# Patient Record
Sex: MALE | Race: WHITE | Employment: OTHER | URBAN - METROPOLITAN AREA
[De-identification: names, ages, dates, MRNs, and addresses within clinical notes are randomized per-mention and may not be internally consistent; named-entity substitution may affect disease eponyms.]

---

## 2018-07-30 LAB
LV EF: 65 %
LVEF MODALITY: NORMAL

## 2022-11-23 LAB — CPK: 73 U/L (ref 39–308)

## 2022-11-28 ENCOUNTER — HOSPITAL ENCOUNTER (OUTPATIENT)
Age: 67
Discharge: HOME | End: 2022-11-28
Payer: COMMERCIAL

## 2022-11-28 VITALS
OXYGEN SATURATION: 93 % | DIASTOLIC BLOOD PRESSURE: 86 MMHG | WEIGHT: 288 LBS | RESPIRATION RATE: 18 BRPM | BODY MASS INDEX: 41.23 KG/M2 | TEMPERATURE: 98 F | HEART RATE: 69 BPM | SYSTOLIC BLOOD PRESSURE: 136 MMHG | HEIGHT: 70 IN

## 2022-11-28 LAB
ABO/RH: NORMAL
ANTIBODY SCREEN: NORMAL

## 2022-11-28 PROCEDURE — C1769 GUIDE WIRE: HCPCS

## 2022-11-28 PROCEDURE — 93454 CORONARY ARTERY ANGIO S&I: CPT

## 2022-11-28 PROCEDURE — 86901 BLOOD TYPING SEROLOGIC RH(D): CPT

## 2022-11-28 PROCEDURE — 86850 RBC ANTIBODY SCREEN: CPT

## 2022-11-28 PROCEDURE — C1887 CATHETER, GUIDING: HCPCS

## 2022-11-28 PROCEDURE — C1894 INTRO/SHEATH, NON-LASER: HCPCS

## 2022-11-28 PROCEDURE — 36415 COLL VENOUS BLD VENIPUNCTURE: CPT

## 2022-11-28 PROCEDURE — 2500000003 HC RX 250 WO HCPCS

## 2022-11-28 PROCEDURE — 6360000002 HC RX W HCPCS

## 2022-11-28 PROCEDURE — 86900 BLOOD TYPING SEROLOGIC ABO: CPT

## 2022-11-28 PROCEDURE — 2709999900 HC NON-CHARGEABLE SUPPLY

## 2022-11-28 RX ORDER — ATORVASTATIN CALCIUM 40 MG/1
40 TABLET, FILM COATED ORAL DAILY
Qty: 90 TABLET | Refills: 2 | Status: SHIPPED | OUTPATIENT
Start: 2022-11-28

## 2022-11-28 RX ORDER — ALBUTEROL SULFATE 90 UG/1
2 AEROSOL, METERED RESPIRATORY (INHALATION) EVERY 4 HOURS PRN
COMMUNITY

## 2022-11-28 RX ORDER — SODIUM CHLORIDE 0.9 % (FLUSH) 0.9 %
5-40 SYRINGE (ML) INJECTION EVERY 12 HOURS SCHEDULED
Status: DISCONTINUED | OUTPATIENT
Start: 2022-11-28 | End: 2022-11-29 | Stop reason: HOSPADM

## 2022-11-28 RX ORDER — ACETAMINOPHEN 325 MG/1
650 TABLET ORAL EVERY 4 HOURS PRN
Status: DISCONTINUED | OUTPATIENT
Start: 2022-11-28 | End: 2022-11-29 | Stop reason: HOSPADM

## 2022-11-28 RX ORDER — SODIUM CHLORIDE 0.9 % (FLUSH) 0.9 %
5-40 SYRINGE (ML) INJECTION PRN
Status: DISCONTINUED | OUTPATIENT
Start: 2022-11-28 | End: 2022-11-29 | Stop reason: HOSPADM

## 2022-11-28 RX ORDER — FORMOTEROL FUMARATE 20 UG/2ML
20 SOLUTION RESPIRATORY (INHALATION) EVERY 12 HOURS SCHEDULED
COMMUNITY

## 2022-11-28 RX ORDER — POTASSIUM CHLORIDE 20 MEQ/1
20 TABLET, EXTENDED RELEASE ORAL 2 TIMES DAILY
COMMUNITY

## 2022-11-28 RX ORDER — BUMETANIDE 2 MG/1
2 TABLET ORAL DAILY
Qty: 90 TABLET | Refills: 2 | Status: SHIPPED | OUTPATIENT
Start: 2022-11-28

## 2022-11-28 RX ORDER — SODIUM CHLORIDE 9 MG/ML
INJECTION, SOLUTION INTRAVENOUS PRN
Status: DISCONTINUED | OUTPATIENT
Start: 2022-11-28 | End: 2022-11-29 | Stop reason: HOSPADM

## 2022-11-28 RX ORDER — IRON POLYSACCH/IRON HEME POLYP 28 MG
TABLET ORAL
COMMUNITY

## 2022-11-28 RX ORDER — DOCUSATE SODIUM 100 MG/1
100 CAPSULE, LIQUID FILLED ORAL DAILY
COMMUNITY

## 2022-11-28 RX ORDER — FLUOXETINE 10 MG/1
10 CAPSULE ORAL DAILY
COMMUNITY

## 2022-11-28 RX ORDER — ATORVASTATIN CALCIUM 40 MG/1
40 TABLET, FILM COATED ORAL DAILY
COMMUNITY
End: 2022-11-28 | Stop reason: SDUPTHER

## 2022-11-28 RX ORDER — METOPROLOL SUCCINATE 25 MG/1
25 TABLET, EXTENDED RELEASE ORAL DAILY
COMMUNITY

## 2022-11-28 RX ORDER — MULTIVITAMIN/IRON/FOLIC ACID 18MG-0.4MG
TABLET ORAL
COMMUNITY

## 2022-11-28 RX ORDER — LEVETIRACETAM 500 MG/1
500 TABLET ORAL 3 TIMES DAILY
COMMUNITY

## 2022-11-28 RX ORDER — MONTELUKAST SODIUM 10 MG/1
10 TABLET ORAL NIGHTLY
COMMUNITY

## 2022-11-28 RX ORDER — LAMOTRIGINE 25 MG/1
75 TABLET ORAL 2 TIMES DAILY
COMMUNITY

## 2022-11-28 RX ORDER — GABAPENTIN 300 MG/1
300 CAPSULE ORAL 2 TIMES DAILY
COMMUNITY

## 2022-11-28 RX ORDER — CLOPIDOGREL BISULFATE 75 MG/1
75 TABLET ORAL DAILY
Qty: 90 TABLET | Refills: 3 | Status: SHIPPED | OUTPATIENT
Start: 2022-11-28

## 2022-11-28 RX ORDER — ASPIRIN 81 MG/1
81 TABLET ORAL DAILY
Qty: 180 TABLET | Refills: 2 | Status: SHIPPED | OUTPATIENT
Start: 2022-11-28

## 2022-11-28 RX ORDER — BUMETANIDE 2 MG/1
2 TABLET ORAL DAILY
COMMUNITY
End: 2022-11-28 | Stop reason: SDUPTHER

## 2022-11-28 NOTE — PROCEDURES
[address]                  [address] [facility],  [facility]                            CARDIAC CATHETERIZATION    PATIENT NAME: Amy Gillespie                          :        1955  MED REC NO:   [id number]                            ROOM:  ACCOUNT NO:   [de-identified]                           ADMIT DATE: 2022  PROVIDER:     Adalgisa Hutchinson MD    DATE OF PROCEDURE:  2022    PROCEDURES PERFORMED:  1. Coronary angiography. 2.  Conscious sedation using Versed and fentanyl. Indication #4, score of 8. INDICATION FOR PROCEDURE:  The patient is a 55-year-old male with  diabetes, presented to the hospital with complaints of not feeling well,  was found to have non-ST-elevation MI. The patient was brought to the  cath lab for further evaluation. DESCRIPTION OF PROCEDURE:  After the appropriate informed consent, the  right wrist area was prepped and draped in the usual sterile fashion. A  time-out was called. The right wrist area was locally anesthetized with  2 mL of 2% lidocaine. A 21-gauge needle was used to access the right  radial artery. A 6-Divehi introducer sheath was used to cannulate the  right radial artery. It turned out to be very difficult and very  challenging coronary angiography due to significant tortuosity of the  aortic arch and aortic root dilatation, so we were able to engage the  left main coronary artery using JL6 catheter, which after the 3.5-Divehi  JL4 and JL5 unsuccessfully. The right coronary artery was very  difficult to engage. We had not selected picture through aortic root  angiography and we were able to get a picture through fluoroscopy;  however, we were not able to save it and the catheter got disengaged and  we had significant difficulties trying to re-engage, and since it was a  small vessel, further steps were abandoned. ANGIOGRAPHIC FINDINGS:  1. Significantly dilated aortic root.   2.  The left main coronary artery arises normally from the left sinus of  Valsalva. It is a short vessel, bifurcates into a left anterior  descending artery and left circumflex artery. The left anterior  descending artery extends down to the apex. It gives off a large  diagonal branch and a small one. The LAD and its branches have no CAD. The left circumflex artery is a very large vessel, dominant circulation. It gives off a large OM branch that subdivided into two sub-branches. The anterior sub-branch has myocardial bridging in the mid segment. The  rest of the vessel has no CAD. The right coronary artery as I mentioned above, we were not able to save  cine, but it is a small vessel, has antegrade takeoff and it tapers off  significantly after the mid segment. At the end of the procedure, the  catheter and the wire were pulled out from the body, the sheath was  pulled out from the body, and a Vasc Band was applied to the right wrist  area with effective hemostasis. COMPLICATIONS:  None. ESTIMATED TOTAL BLOOD LOSS:  30 to 40 mL. MEDICATIONS USED DURING THE PROCEDURE:  We used intra-arterial verapamil  to prevent vasospasm. We used intra-arterial heparin for  anticoagulation. CONSCIOUS SEDATION:  We used Versed and fentanyl for conscious sedation. First dose was given at [phone], procedure ended at [phone]; there were 75  minutes of direct face-to-face supervision during conscious sedation  administration. TOTAL FLUOROSCOPY TIME:  30.7 minutes. TOTAL CONTRAST USED:  250 mL of Isovue. IMPRESSION:  1. Very difficult and very challenging cardiac catheterization through  right radial approach. 2.  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 3.  Small nondominant RCA. 4.  Significant tortuosity involving the brachiocephalic trunk and the  aortic arch area. RECOMMENDATIONS:  1.   Aspirin for life.  2.  P2Y12 inhibitors for at least a year. 3.  Aggressive coronary artery disease risk factor modification. 4.  The patient will be observed for two hours and discharged home if he  meets discharge criteria.         Rosa Ayala MD    D: 11/28/2022 14:11:04       T: 11/28/2022 15:20:42     [email or web address]  Job#: [id number]     Doc#: [id number]    CC:  Pebbles Riddle MD

## 2024-11-02 ENCOUNTER — HOSPITAL ENCOUNTER (EMERGENCY)
Dept: HOSPITAL 83 - ED | Age: 69
Discharge: HOME | End: 2024-11-02
Payer: MEDICARE

## 2024-11-02 VITALS — HEIGHT: 70.98 IN | WEIGHT: 301 LBS | BODY MASS INDEX: 42.14 KG/M2

## 2024-11-02 DIAGNOSIS — Z79.899: ICD-10-CM

## 2024-11-02 DIAGNOSIS — Z79.84: ICD-10-CM

## 2024-11-02 DIAGNOSIS — Z96.643: ICD-10-CM

## 2024-11-02 DIAGNOSIS — Z91.018: ICD-10-CM

## 2024-11-02 DIAGNOSIS — I25.10: ICD-10-CM

## 2024-11-02 DIAGNOSIS — R07.89: Primary | ICD-10-CM

## 2024-11-02 DIAGNOSIS — Z88.8: ICD-10-CM

## 2024-11-02 DIAGNOSIS — Z79.82: ICD-10-CM

## 2024-11-02 DIAGNOSIS — Z87.891: ICD-10-CM

## 2024-11-02 DIAGNOSIS — Z88.2: ICD-10-CM

## 2024-11-02 DIAGNOSIS — Z90.49: ICD-10-CM

## 2024-11-02 DIAGNOSIS — F41.9: ICD-10-CM

## 2024-11-02 LAB
APTT PPP: 26.9 SECONDS (ref 20–32.1)
BASOPHILS # BLD AUTO: 1 % (ref 0–1)
BUN SERPL-MCNC: 18 MG/DL (ref 9–23)
CHLORIDE SERPL-SCNC: 106 MMOL/L (ref 98–107)
ERYTHROCYTE [DISTWIDTH] IN BLOOD BY AUTOMATED COUNT: 16.4 % (ref 0–14.5)
HCT VFR BLD AUTO: 44.9 % (ref 42–52)
MANUAL DIFFERENTIAL PERFORMED BLD QL: YES
MCH RBC QN AUTO: 27.5 PG (ref 27–31)
MCHC RBC AUTO-ENTMCNC: 31 G/DL (ref 33–37)
MCV RBC AUTO: 88.7 FL (ref 80–94)
NRBC BLD QL AUTO: 0 % (ref 0–0)
PLATELET # BLD AUTO: 129 10*3/UL (ref 130–400)
PLATELET SUFFICIENCY: (no result)
PMV BLD AUTO: 12.9 FL (ref 9.6–12.3)
POTASSIUM SERPL-SCNC: 3.7 MMOL/L (ref 3.4–5.1)
RBC # BLD AUTO: 5.06 10*6/UL (ref 4.5–5.9)
RBC MORPH BLD: NORMAL
TOTAL CELLS COUNTED: 100 #CELLS
WBC NRBC COR # BLD AUTO: 7.4 10*3/UL (ref 4.8–10.8)